# Patient Record
Sex: MALE | URBAN - METROPOLITAN AREA
[De-identification: names, ages, dates, MRNs, and addresses within clinical notes are randomized per-mention and may not be internally consistent; named-entity substitution may affect disease eponyms.]

---

## 2017-04-12 NOTE — PATIENT DISCUSSION
1.  Glaucoma suspect OU - No signs of glaucomatous damage to the optic nerve based on todays examination and testing. Will continue to monitor. LVF 5/12/17 normal OCT 10/142. Nuclear Sclerotic Cataract OU: Explained how cataracts can effect vision. Recommend clinical observation. The patient was advised to contact us if any change or worsening of vision. 3. S/P BULB Laquis 8/153. No Rx change- changed in 10/16 at [facility] 32.   RTN 6 mth CE/OCT

## 2017-11-15 NOTE — PATIENT DISCUSSION
1.  Glaucoma suspect OU - No signs of glaucomatous damage to the optic nerve based on todays examination and testing. Will continue to monitor. LVF 5/12/17 normal OCT 11/15/17  RNFL 60/55. Questionable results. 2.  Nuclear Sclerotic Cataract OU:  Getting slightly more brown. Explained how cataracts can effect vision. Recommend clinical observation. The patient was advised to contact us if any change or worsening of vision. 3. S/P BULB Laquis 8/153. No Rx change- changed in 10/16 at [facility] 32. RTN 6 mth  DF/VF and optos.

## 2018-05-16 ENCOUNTER — IMPORTED ENCOUNTER (OUTPATIENT)
Dept: URBAN - METROPOLITAN AREA CLINIC 1 | Facility: CLINIC | Age: 33
End: 2018-05-16

## 2018-05-16 PROBLEM — H02.051: Noted: 2018-05-16

## 2018-05-16 PROCEDURE — 67820 REVISE EYELASHES: CPT

## 2018-05-16 PROCEDURE — 92002 INTRM OPH EXAM NEW PATIENT: CPT

## 2018-05-16 NOTE — PATIENT DISCUSSION
1.  Trichiasis Right Eyelid - An inturned lash of the right eyelid was present. Epilation with a forceps was recommended and performed. Gave patient a sample of Blink to use QID OU 2. Epilation with Forceps OD: Risks benefits and alternatives to epilation of eyelash was discussed with patient. Patient understands and would like to proceed. Consent was signed. Post op instructions were discussed/given to patient and patient was advised to call our office if any problems. 3. Return for an appointment in as needed with Dr. Alexander Valdez.

## 2018-05-16 NOTE — PATIENT DISCUSSION
Epilation with Forceps OD: Risks benefits and alternatives to epilation of eyelash was discussed with patient. Patient understands and would like to proceed. Consent was signed. Post op instructions were discussed/given to patient and patient was advised to call our office if any problems.

## 2018-05-18 NOTE — PATIENT DISCUSSION
1.  Glaucoma suspect OU - No signs of glaucomatous damage to the optic nerve based on todays examination and testing. Will continue to monitor. LVF 5/18/18 normal OCT 11/15/17  RNFL 60/55. Questionable results. 2.  Nuclear Sclerotic Cataract OU:  Getting slightly more brown. Explained how cataracts can effect vision. Recommend clinical observation. The patient was advised to contact us if any change or worsening of vision. 3. S/P BULB Laquis 8/153. No Rx change- changed in 10/16 at [facility] 32. Pt has been stable would like to be seen every 9-12 mths.   RTN 1 yr CE/OCT

## 2019-05-20 NOTE — PATIENT DISCUSSION
1.  Glaucoma suspect OU - Poss signs of glaucomatous damage to the optic nerve based on todays examination and testing. Will continue to monitor. LVF 5/18/18 normal OCT 5/20/19  RNFL Thinning OU.  80/78 Poor GCC OD normal GCC OS. .  Questionable results. 2.  Nuclear Sclerotic Cataract OU:  Getting slightly more brown. Explained how cataracts can effect vision. Recommend clinical observation. The patient was advised to contact us if any change or worsening of vision. will need sx soon oD. 3.   S/P BULB Laquis 8/153. No Rx change- changed in 10/16 at [facility] 32. Pt has been stable would like to be seen every 9-12 mths.   5.  RTN 1 yr CE/VF/optos

## 2020-05-20 NOTE — PATIENT DISCUSSION
1.  Dry Eyes OU:  Start artificials tears refresh optive 2-3x per day. Pt takes allergy meds. Encouraged regular use. 2.  Glaucoma suspect OU -   Large ONH's OS>OD--Poss signs of glaucomatous damage to the optic nerve based on todays examination and testing. Will continue to monitor. LVF 5/20/20 normal OCT 5/20/19  RNFL Thinning OU.  80/78 Poor GCC OD normal GCC OS. .  Questionable results. 3.  Nuclear Sclerotic Cataract OU:  Getting slightly more brown. Explained how cataracts can effect vision. Recommend clinical observation. The patient was advised to contact us if any change or worsening of vision. will need sx soon oD. 4.   S/P BULB Laquis 8/155. Rx change- changed in 10/16 at [facility] copy6. Pt has been stable would like to be seen every 9-12 mths.   7.  RTN 1 yr CE/OCT Nerve/optos--FU on ONH

## 2020-05-20 NOTE — PATIENT DISCUSSION
1.  Glaucoma suspect OU - Poss signs of glaucomatous damage to the optic nerve based on todays examination and testing. Will continue to monitor. LVF 5/18/18 normal OCT 5/20/19  RNFL Thinning OU.  80/78 Poor GCC OD normal GCC OS. .  Questionable results. 2.  Nuclear Sclerotic Cataract OU:  Getting slightly more brown. Explained how cataracts can effect vision. Recommend clinical observation. The patient was advised to contact us if any change or worsening of vision. will need sx soon oD. 3.   S/P BULB Laquis 8/153. No Rx change- changed in 10/16 at [facility] 32. Pt has been stable would like to be seen every 9-12 mths.   5.  RTN 1 yr CE/VF/optos

## 2021-05-19 NOTE — PATIENT DISCUSSION
1.  Dry Eyes OU:  Start artificials tears refresh optive 2-3x per day. Pt takes allergy meds. Encouraged regular use. 2. POAG MILD--- -   Large ONH's OS>OD--  VF 5/19/21  OD scattered borderline OS nasal defect inferiorly. Signs of glaucomatous damage to the optic nerve based on todays examination and testing. Rx Latanaprost qhs ou. OCT 5/20/19  RNFL Thinning OU.  80/78 Poor GCC OD normal GCC OS. .  3.  Nuclear Sclerotic Cataract OU:  Getting slightly more brown. Explained how cataracts can effect vision. Recommend clinical observation. The patient was advised to contact us if any change or worsening of vision. will need sx soon oD. 4.   S/P BULB Laquis 8/155. No Rx change- changed in 10/16 at [facility] copy-wants new rx suns6. RTN 1 mth tack-  7.  RTN  6 mths DF/OCT Nerve--FU on GLC and ONH

## 2021-05-19 NOTE — PATIENT DISCUSSION
1.  Dry Eyes OU:  Start artificials tears refresh optive 2-3x per day. Pt takes allergy meds. Encouraged regular use. 2.  Glaucoma suspect OU -   Large ONH's OS>OD--Poss signs of glaucomatous damage to the optic nerve based on todays examination and testing. Will continue to monitor. LVF 5/20/20 normal OCT 5/20/19  RNFL Thinning OU.  80/78 Poor GCC OD normal GCC OS. .  Questionable results. 3.  Nuclear Sclerotic Cataract OU:  Getting slightly more brown. Explained how cataracts can effect vision. Recommend clinical observation. The patient was advised to contact us if any change or worsening of vision. will need sx soon oD. 4.   S/P BULB Laquis 8/155. Rx change- changed in 10/16 at [facility] copy6. Pt has been stable would like to be seen every 9-12 mths.   7.  RTN 1 yr CE/OCT Nerve/optos--FU on ONH

## 2021-11-19 NOTE — PATIENT DISCUSSION
1. POAG MILD OD MODerate OS-- -Change to Briminidine . 2% bid ou. Large ONH's OS>OD--  VF 5/19/21  OD scattered borderline OS nasal defect inferiorly. Signs of glaucomatous damage to the optic nerve based on todays examination and testing. OCT 11/19/21  Thinning- OS>OD-  RNFL: 81/64 from 80/78 Poor GCC OD normal GCC OS. .  2.  Nuclear Sclerotic Cataract OU:  Getting slightly more brown. Explained how cataracts can effect vision. Recommend clinical observation. The patient was advised to contact us if any change or worsening of vision. will need sx soon oD. 3.   S/P BULB Laquis 8/154. No Rx change- changed in 10/16 at [facility] copy-wants new rx suns5. Dry Eyes OU:  Cont  artificials tears refresh optive 2-3x per day. Pt takes allergy meds. Encouraged regular use.  6.  RTN  4 weeks TACK--FU on Brimonidine 7  RTN 6 mths CE/VF/OPtos

## 2021-12-17 NOTE — PATIENT DISCUSSION
POAG MILD OD MODerate OS-- -Much improved IOP with Briminidine . 2% bid ou. Large ONH's OS>OD-- VF 5/19/21 OD scattered borderline OS nasal defect inferiorly. Signs of glaucomatous damage to the optic nerve based on todays examination and testing. OCT 11/19/21 Thinning- OS>OD- RNFL: 81/64 from 80/78 Poor GCC OD normal GCC OS. Jessica Brina current management.

## 2021-12-17 NOTE — PATIENT DISCUSSION
1. POAG MILD OD MODerate OS-- -Much improved IOP with Briminidine . 2% bid ou. Large ONH's OS>OD--  VF 5/19/21  OD scattered borderline OS nasal defect inferiorly. Signs of glaucomatous damage to the optic nerve based on todays examination and testing. OCT 11/19/21  Thinning- OS>OD-  RNFL: 81/64 from 80/78 Poor GCC OD normal GCC OS. .  2.  Nuclear Sclerotic Cataract OU:  Getting slightly more brown. Explained how cataracts can effect vision. Recommend clinical observation. The patient was advised to contact us if any change or worsening of vision. will need sx soon oD. 3.   S/P BULB Laquis 8/154. No Rx change- changed in 10/16 at [facility] copy-wants new rx suns5. Dry Eyes OU:  Cont  artificials tears refresh optive 2-3x per day. Pt takes allergy meds. Encouraged regular use.  6.  RTN 5/22 CE/VF/OPtos--FU on GLC and Brimnoidine

## 2022-04-02 ASSESSMENT — VISUAL ACUITY
OD_CC: 20/20-1
OS_CC: 20/20-1
OD_SC: J1+
OS_SC: J1+

## 2022-04-02 ASSESSMENT — TONOMETRY
OS_IOP_MMHG: 14
OD_IOP_MMHG: 14

## 2022-05-13 NOTE — PATIENT DISCUSSION
POAG MILD OD MODerate OS-- -Much improved IOP with Briminidine . 2% bid ou. Large ONH's OS>OD-- Continue current management.

## 2022-05-13 NOTE — PATIENT DISCUSSION
VF 5/13/22 OD scattered borderline OS nasal defect inferiorly. OCT 11/19/21 Thinning- OS>OD- RNFL: 81/64 from 80/78 Poor GCC OD normal GCC OS. Teresa Canter

## 2022-07-27 NOTE — PATIENT DISCUSSION
Elevated IOP OS>OD--- 32/42.  Gave pt 250 mg Diamox in office and reduced IOP slightly.  Gave pt 500mg Diamox to use this evening and use another in AM.  Gave sample of Simbrinza t use bid in the meantime.  Return tomorrow for eval with DR Ventura and possible SLT or consider Cat sx and Istent.

## 2022-07-27 NOTE — PATIENT DISCUSSION
POAG MILD OD MODerate OS-- -Much improved IOP with Brimonidine . 2% bid ou. Large ONH's OS>OD-- Continue current management.

## 2022-07-27 NOTE — PATIENT DISCUSSION
VF 5/13/22 OD scattered borderline OS nasal defect inferiorly. OCT 11/19/21 Thinning- OS>OD- RNFL: 81/64 from 80/78 Poor GCC OD normal GCC OS. Jody Ortiz

## 2022-07-27 NOTE — PATIENT DISCUSSION
Gave pt Diamox in office and will return tomorrow with eval with DR Ventura --SLT vs cat sx  w Stent.

## 2022-07-28 NOTE — PATIENT DISCUSSION
The IOP remains elevated despite maximal tolerated medication. Glaucoma Surgery is recommended. The risks, benefits, and alternatives to surgery were discussed including infection, bleeding, loss of vision, retinal tears detachment, success rate and possible need for additional glaucoma surgery in the future. The patient elects to proceed with surgery.  Schedule Trab with [facility] OS, DC blood thinners 5 days before surgery. Add latanoprost ou qhs, continue simbrinza 2x/d OS, diamox 250 2x/dBrochures given glaucoma and glaucoma surgery.

## 2022-07-28 NOTE — PATIENT DISCUSSION
VF 5/13/22 OD scattered borderline OS nasal defect inferiorly. OCT 11/19/21 Thinning- OS>OD- RNFL: 81/64 from 80/78 Poor GCC OD normal GCC OS. Barry Birch

## 2022-07-28 NOTE — PATIENT DISCUSSION
POAG MILD OD MODerate OS-- -Much improved IOP with Brimonidine . 2% bid ou. Large ONH's OS>OD-- Continue current management. (0) independent

## 2022-08-04 NOTE — PATIENT DISCUSSION
The IOP remains elevated despite maximal tolerated medication. Glaucoma Surgery is recommended. The risks, benefits, and alternatives to surgery were discussed including infection, bleeding, loss of vision, retinal tears detachment, success rate and possible need for additional glaucoma surgery in the future. The patient elects to proceed with surgery.  Schedule Trab with [address] [facility] OS, DC blood thinners 5 days before surgery.  Continue latanoprost ou qhs, continue simbrinza 2x/d OS,  increase diamox 250 3x/d  All questions answered with patient and daughter.

## 2022-08-11 NOTE — PATIENT DISCUSSION
No Rx change- changed in 10/16 at [facility] copy-wants new rx suns.

## 2022-08-11 NOTE — PATIENT DISCUSSION
The IOP remains elevated despite maximal tolerated medication. Glaucoma Surgery is recommended. The risks, benefits, and alternatives to surgery were discussed including infection, bleeding, loss of vision, retinal tears detachment, success rate and possible need for additional glaucoma surgery in the future. The patient elects to proceed with surgery.  Schedule Trab with [address] [facility] OS, DC blood thinners 5 days before surgery.  Continue latanoprost ou qhs, continue simbrinza 2x/d OS,  Continue diamox 250 3x/d  All questions answered with patient and daughter.

## 2022-08-16 NOTE — PATIENT DISCUSSION
No Rx change- changed in 10/16 at [facility] copy-wants new rx suns.

## 2022-08-16 NOTE — PROCEDURE NOTE: SURGICAL
TRAB W/ EXPRESS MINI TUBE SHUNT W/ [facility] FEP @ &nbsp;

## 2022-08-25 NOTE — PATIENT DISCUSSION
pt was hospitalized with dehydration and electrolyte imbalance question of mini stroke now doing better.

## 2022-08-25 NOTE — PATIENT DISCUSSION
Post op instructions reviewed with patients including the use of drops.  DC Moxi, DC atropine, PRED 4x/d for a week then 3x/d.

## 2022-11-03 NOTE — PATIENT DISCUSSION
Patient understands condition, prognosis and need for follow up care.  IOP ok but has crept up t/c restarting latanoprost OS.